# Patient Record
Sex: FEMALE | Race: WHITE | NOT HISPANIC OR LATINO | Employment: FULL TIME | URBAN - METROPOLITAN AREA
[De-identification: names, ages, dates, MRNs, and addresses within clinical notes are randomized per-mention and may not be internally consistent; named-entity substitution may affect disease eponyms.]

---

## 2024-05-20 ENCOUNTER — OFFICE VISIT (OUTPATIENT)
Dept: ORTHOPEDIC SURGERY | Facility: CLINIC | Age: 29
End: 2024-05-20
Payer: COMMERCIAL

## 2024-05-20 VITALS — BODY MASS INDEX: 23.92 KG/M2 | WEIGHT: 135 LBS | HEIGHT: 63 IN

## 2024-05-20 DIAGNOSIS — S82.832S CLOSED FRACTURE OF DISTAL END OF LEFT FIBULA, UNSPECIFIED FRACTURE MORPHOLOGY, SEQUELA: Primary | ICD-10-CM

## 2024-05-20 PROCEDURE — 99213 OFFICE O/P EST LOW 20 MIN: CPT | Performed by: ORTHOPAEDIC SURGERY

## 2024-05-20 PROCEDURE — 99203 OFFICE O/P NEW LOW 30 MIN: CPT | Performed by: ORTHOPAEDIC SURGERY

## 2024-05-20 PROCEDURE — 1036F TOBACCO NON-USER: CPT | Performed by: ORTHOPAEDIC SURGERY

## 2024-05-20 PROCEDURE — L4361 PNEUMA/VAC WALK BOOT PRE OTS: HCPCS | Performed by: ORTHOPAEDIC SURGERY

## 2024-05-20 RX ORDER — MOMETASONE FUROATE AND FORMOTEROL FUMARATE DIHYDRATE 200; 5 UG/1; UG/1
2 AEROSOL RESPIRATORY (INHALATION) 2 TIMES DAILY
COMMUNITY
Start: 2023-08-18

## 2024-05-20 RX ORDER — OMEPRAZOLE 20 MG/1
20 CAPSULE, DELAYED RELEASE ORAL
COMMUNITY
Start: 2024-02-20

## 2024-05-20 ASSESSMENT — PAIN - FUNCTIONAL ASSESSMENT: PAIN_FUNCTIONAL_ASSESSMENT: 0-10

## 2024-05-20 ASSESSMENT — PAIN SCALES - GENERAL: PAINLEVEL_OUTOF10: 2

## 2024-05-20 NOTE — PROGRESS NOTES
29-year-old female here for left ankle injury.  Slipped and fell while finishing marathon yesterday with a friend.  She went to run the last half of the marathon and when she left the race course she slipped twisting her left ankle.  She limped on it for about 1/2 mile.  She felt she sprained it.  Went to an urgent care had x-rays taken.  Was placed into a splint.  She is a competitive  and .  Works as a .  No diabetes.    On exam:  WD/WN athletic female  A+O X3  NAD  No lymphedema  Inspection of both feet and ankles show symmetric arches.   Nontender over medial gutter left ankle.  Tender over distal fibula.  No crepitus with ankle motion.  5/5 strength in all 4 planes.   Sensation intact to LT.   Good pulses.     I personally reviewed the following radiographic exams: Left ankle shows nondisplaced Marsh B distal fibular fracture.  No medial widening.    Assessment: Nondisplaced left distal fibular fracture.    Plan: Discussed nonoperative and operative options in detail.   Risk and benefits discussed in detail. All questions answered today.  Recovery timeline and expectations discussed in detail.  Reassured patient.  Stable fracture based on her history and exam.  No surgery necessary.  Recommend cast boot with protected weightbearing using crutches.  Active range of motion out of boot couple times a day.  Follow-up x-ray in 2 weeks.    Addendum: Patient called later is feeling her foot is not sitting in the boot and cannot get it comfortable.  Offered that she come back to clinic today to be readjusted.  May need a heel lift.  She will come in Wednesday morning to reevaluate the boot.  May consider short leg cast though not necessary for the fracture.

## 2024-05-30 ENCOUNTER — OFFICE VISIT (OUTPATIENT)
Dept: ORTHOPEDIC SURGERY | Facility: CLINIC | Age: 29
End: 2024-05-30
Payer: COMMERCIAL

## 2024-05-30 ENCOUNTER — HOSPITAL ENCOUNTER (OUTPATIENT)
Dept: RADIOLOGY | Facility: CLINIC | Age: 29
Discharge: HOME | End: 2024-05-30
Payer: COMMERCIAL

## 2024-05-30 DIAGNOSIS — S82.832S CLOSED FRACTURE OF DISTAL END OF LEFT FIBULA, UNSPECIFIED FRACTURE MORPHOLOGY, SEQUELA: ICD-10-CM

## 2024-05-30 PROCEDURE — 73610 X-RAY EXAM OF ANKLE: CPT | Mod: LEFT SIDE | Performed by: RADIOLOGY

## 2024-05-30 PROCEDURE — 1036F TOBACCO NON-USER: CPT | Performed by: ORTHOPAEDIC SURGERY

## 2024-05-30 PROCEDURE — 99213 OFFICE O/P EST LOW 20 MIN: CPT | Performed by: ORTHOPAEDIC SURGERY

## 2024-05-30 PROCEDURE — 73610 X-RAY EXAM OF ANKLE: CPT | Mod: LT

## 2024-05-30 NOTE — PROGRESS NOTES
Returns for left ankle.  Protected weightbearing in the boot.  Has some bruising up her leg laterally.    Exam: Tender over distal fibular fracture site.  Tender bruising over lateral compartment.  No fluctuance.    I personally reviewed the following radiographic exams: Left ankle shows minimally displaced distal fibular fracture.  No medial widening.    Assessment: Left distal fibular fracture.    Plan: Discussed nonoperative and operative options in detail.   Risk and benefits discussed in detail. All questions answered today.  Recovery timeline and expectations discussed in detail.  No surgery indicated.  Progress weightbearing as tolerated in the boot.  Active range of motion of ankle out of boot.  Follow-up x-ray in 2 weeks.  Hope to transition out of boot at that time.

## 2024-06-13 ENCOUNTER — APPOINTMENT (OUTPATIENT)
Dept: ORTHOPEDIC SURGERY | Facility: CLINIC | Age: 29
End: 2024-06-13
Payer: COMMERCIAL

## 2024-06-13 ENCOUNTER — HOSPITAL ENCOUNTER (OUTPATIENT)
Dept: RADIOLOGY | Facility: CLINIC | Age: 29
Discharge: HOME | End: 2024-06-13
Payer: COMMERCIAL

## 2024-06-13 DIAGNOSIS — S82.832S CLOSED FRACTURE OF DISTAL END OF LEFT FIBULA, UNSPECIFIED FRACTURE MORPHOLOGY, SEQUELA: Primary | ICD-10-CM

## 2024-06-13 DIAGNOSIS — S82.832S CLOSED FRACTURE OF DISTAL END OF LEFT FIBULA, UNSPECIFIED FRACTURE MORPHOLOGY, SEQUELA: ICD-10-CM

## 2024-06-13 PROCEDURE — 73610 X-RAY EXAM OF ANKLE: CPT | Mod: LT

## 2024-06-13 PROCEDURE — L1902 AFO ANKLE GAUNTLET PRE OTS: HCPCS | Performed by: ORTHOPAEDIC SURGERY

## 2024-06-13 PROCEDURE — 99213 OFFICE O/P EST LOW 20 MIN: CPT | Performed by: ORTHOPAEDIC SURGERY

## 2024-06-13 PROCEDURE — 1036F TOBACCO NON-USER: CPT | Performed by: ORTHOPAEDIC SURGERY

## 2024-06-13 ASSESSMENT — PAIN - FUNCTIONAL ASSESSMENT: PAIN_FUNCTIONAL_ASSESSMENT: NO/DENIES PAIN

## 2024-06-13 NOTE — PROGRESS NOTES
Returns for left ankle.  Has been wearing the boot throughout the day.  Still swells during the day.  Complains of some pain along her calf out of the boot at night walking around for shower.    Exam: Minimal swelling.  Nontender along gastrocsoleus.  Nontender distal Achilles.  Some stiffness with eversion.  No crepitus over distal fibula.  No bony tenderness.  Some pain with resisted eversion.    I personally reviewed the following radiographic exams: Left ankle shows healing nondisplaced distal fibular fracture.  Good alignment.    Assessment: Healing left distal fibular fracture.    Plan: Can wean from boot to lace up brace for support.  Can try some low impact exercise over the next 2 weeks.  If she feels okay she can get back to running in line on the treadmill.  Should be unrestricted by about 8 weeks from injury.  He can return to competitive tennis with the ankle brace in about 2 weeks if comfortable.  Recommend she wear the brace for about 4 weeks.  Will follow-up as needed.